# Patient Record
Sex: FEMALE
[De-identification: names, ages, dates, MRNs, and addresses within clinical notes are randomized per-mention and may not be internally consistent; named-entity substitution may affect disease eponyms.]

---

## 2019-02-17 ENCOUNTER — HOSPITAL ENCOUNTER (EMERGENCY)
Dept: HOSPITAL 88 - ER | Age: 24
LOS: 1 days | Discharge: HOME | End: 2019-02-18
Payer: SELF-PAY

## 2019-02-17 VITALS — BODY MASS INDEX: 43.95 KG/M2 | WEIGHT: 290 LBS | HEIGHT: 68 IN

## 2019-02-17 DIAGNOSIS — M54.18: ICD-10-CM

## 2019-02-17 DIAGNOSIS — M54.5: Primary | ICD-10-CM

## 2019-02-17 PROCEDURE — 99283 EMERGENCY DEPT VISIT LOW MDM: CPT

## 2019-02-17 PROCEDURE — 72220 X-RAY EXAM SACRUM TAILBONE: CPT

## 2019-02-17 PROCEDURE — 81025 URINE PREGNANCY TEST: CPT

## 2019-02-18 NOTE — DIAGNOSTIC IMAGING REPORT
SACRUM   COCCYX 



HISTORY:  Tailbone pain.    

COMPARISON: None available.

     

FINDINGS:

Bones:

No acute displaced fracture.  

Osseous alignment is within normal limits.



Joints:

The joint spaces are well-maintained.



Soft tissues:

The soft tissues appear unremarkable.





IMPRESSION: 

No acute radiographic abnormality.



Signed by: DR. Deacon Reyes MD on 2/18/2019 12:36 AM